# Patient Record
Sex: FEMALE | Race: OTHER
[De-identification: names, ages, dates, MRNs, and addresses within clinical notes are randomized per-mention and may not be internally consistent; named-entity substitution may affect disease eponyms.]

---

## 2019-12-19 ENCOUNTER — HOSPITAL ENCOUNTER (EMERGENCY)
Dept: HOSPITAL 41 - JD.ED | Age: 54
Discharge: HOME | End: 2019-12-19
Payer: COMMERCIAL

## 2019-12-19 VITALS — SYSTOLIC BLOOD PRESSURE: 122 MMHG | HEART RATE: 70 BPM | DIASTOLIC BLOOD PRESSURE: 71 MMHG

## 2019-12-19 DIAGNOSIS — R07.89: Primary | ICD-10-CM

## 2019-12-19 DIAGNOSIS — Z79.899: ICD-10-CM

## 2019-12-19 PROCEDURE — 85379 FIBRIN DEGRADATION QUANT: CPT

## 2019-12-19 PROCEDURE — 80053 COMPREHEN METABOLIC PANEL: CPT

## 2019-12-19 PROCEDURE — 99285 EMERGENCY DEPT VISIT HI MDM: CPT

## 2019-12-19 PROCEDURE — 93005 ELECTROCARDIOGRAM TRACING: CPT

## 2019-12-19 PROCEDURE — 96374 THER/PROPH/DIAG INJ IV PUSH: CPT

## 2019-12-19 PROCEDURE — 71046 X-RAY EXAM CHEST 2 VIEWS: CPT

## 2019-12-19 PROCEDURE — 36415 COLL VENOUS BLD VENIPUNCTURE: CPT

## 2019-12-19 PROCEDURE — 84484 ASSAY OF TROPONIN QUANT: CPT

## 2019-12-19 PROCEDURE — 85025 COMPLETE CBC W/AUTO DIFF WBC: CPT

## 2019-12-19 NOTE — EDM.PDOC
ED HPI GENERAL MEDICAL PROBLEM





- General


Chief Complaint: Chest Pain


Stated Complaint: CHEST PAIN


Time Seen by Provider: 12/19/19 14:57


Source of Information: Reports: Patient


History Limitations: Reports: No Limitations





- History of Present Illness


INITIAL COMMENTS - FREE TEXT/NARRATIVE: 





Patient is a 53-year-old female who presents with complaints of chest pain and 

shortness of breath that started on Monday. The symptoms began as chest 

"heaviness" and "pressure" with SOB on Monday. She states that the symptoms 

have not been constant since Monday.  At times they will disappear and then 

returned with a sharp pain.  She verbalizes the pain is throughout her thoracic 

region, anterior and posterior.  It is more painful to take a deep breath and 

she is tender to palpation, especially to her upper back.  She states that 

yesterday around 1700 she awoke from a nap with the pain present and was very 

diaphoretic and short of breath.  She denies any associated cough and has not 

been doing any activities that are out of the normal for her, including no 

heavy lifting recently.  She states that she does have a history of anxiety and 

she has been under increased stress as of recently.  She is currently being 

treated for anxiety and depression with Prozac and Xanax.  She denies any 

cardiac history or history of blood clots.


  ** Chest


Pain Score (Numeric/FACES): 7





- Related Data


 Allergies











Allergy/AdvReac Type Severity Reaction Status Date / Time


 


No Known Allergies Allergy   Verified 12/19/19 15:01











Home Meds: 


 Home Meds





ALPRAZolam [Alprazolam] 1 mg PO DAILY 05/12/14 [History]


Lactobacillus Combo No.10 [Probiotic] 4 mg PO DAILY 05/12/14 [History]


Omeprazole 20 mg PO BID 05/12/14 [History]


estradioL [Vivelle-Dot] 1 patch TOP ASDIRECTED 05/12/14 [History]


Fexofenadine/Pseudoephedrine [Allegra-D 24 Hour Tablet] 180 - 240 mg PO 

ASDIRECTED PRN 05/28/14 [History]


Bismuth Subsalicylate [Pepto Bismol] 2 tab PO QID 01/17/15 [History]


Dicyclomine HCl [Bentyl] 20 mg PO Q6HR PRN #10 tablet 01/17/15 [Rx]


Ondansetron [Zofran ODT] 4 mg PO Q6H PRN #10 tab.dis 01/17/15 [Rx]


Naproxen [Naprosyn] 500 mg PO Q12HR PRN #10 tab 12/19/19 [Rx]











ED ROS GENERAL





- Review of Systems


Review Of Systems: See Below


Constitutional: Reports: Diaphoresis.  Denies: Fever, Chills


HEENT: Reports: No Symptoms


Respiratory: Reports: Shortness of Breath.  Denies: Cough


Cardiovascular: Reports: Chest Pain.  Denies: Edema, Palpitations


Endocrine: Reports: No Symptoms


GI/Abdominal: Reports: No Symptoms.  Denies: Abdominal Pain, Diarrhea, Vomiting


: Reports: No Symptoms


Musculoskeletal: Reports: Back Pain, Other (generalized chest wall pain)


Skin: Reports: No Symptoms


Neurological: Reports: No Symptoms.  Denies: Dizziness, Headache


Psychiatric: Reports: Anxiety


Hematologic/Lymphatic: Reports: No Symptoms


Immunologic: Reports: No Symptoms





ED EXAM, GENERAL





- Physical Exam


Exam: See Below


Exam Limited By: No Limitations


General Appearance: Alert, WD/WN, No Apparent Distress


Head: Atraumatic, Normocephalic


Respiratory/Chest: No Respiratory Distress, Lungs Clear, Normal Breath Sounds, 

No Accessory Muscle Use, Other (generalized chest wall tenderness throughout 

the thorax and upper back.  States that the midline upper back is worse than 

the anterior chest.)


Cardiovascular: Normal Peripheral Pulses, Regular Rate, Rhythm, No Edema, No 

Murmur


GI/Abdominal: Normal Bowel Sounds, Soft, Non-Tender, No Distention


Neurological: Alert, Oriented, Normal Cognition


Psychiatric: Normal Affect, Normal Mood


Skin Exam: Warm, Dry, Intact, Normal Color, No Rash





EKG INTERPRETATION


EKG Date: 12/19/19


Time: 15:05


Rhythm: NSR


Rate (Beats/Min): 67


Axis: Normal


P-Wave: Present


QRS: Normal


ST-T: Normal


QT: Normal


Comparison: NA - No Prior EKG





Course





- Vital Signs


Last Recorded V/S: 


 Last Vital Signs











Temp  97.5 F   12/19/19 15:07


 


Pulse  70   12/19/19 15:07


 


Resp  16   12/19/19 15:07


 


BP  122/71   12/19/19 15:07


 


Pulse Ox  99   12/19/19 15:07














- Orders/Labs/Meds


Orders: 


 Active Orders 24 hr











 Category Date Time Status


 


 EKG Documentation Completion [RC] STAT Care  12/19/19 15:22 Active


 


 Peripheral IV Care [RC] .AS DIRECTED Care  12/19/19 15:23 Active


 


 Chest 2V [CR] Stat Exams  12/19/19 15:22 Taken


 


 CBC WITH AUTO DIFF [HEME] Stat Lab  12/19/19 15:09 Results


 


 Sodium Chloride 0.9% [Saline Flush] Med  12/19/19 15:22 Active





 10 ml FLUSH ASDIRECTED PRN   


 


 Peripheral IV Insertion Adult [OM.PC] Stat Oth  12/19/19 15:22 Ordered








 Medication Orders





Sodium Chloride (Saline Flush)  10 ml FLUSH ASDIRECTED PRN


   PRN Reason: Keep Vein Open


   Last Admin: 12/19/19 15:34  Dose: 10 ml








Labs: 


 Laboratory Tests











  12/19/19 12/19/19 12/19/19 Range/Units





  15:09 15:09 15:09 


 


WBC  8.27    (3.98-10.04)  K/mm3


 


RBC  5.41 H    (3.98-5.22)  M/mm3


 


Hgb  12.9    (11.2-15.7)  gm/dl


 


Hct  39.6    (34.1-44.9)  %


 


MCV  73.2 L    (79.4-94.8)  fl


 


MCH  23.8 L    (25.6-32.2)  pg


 


MCHC  32.6    (32.2-35.5)  g/dl


 


RDW Std Deviation  45.4    (36.4-46.3)  fL


 


Plt Count  364    (182-369)  K/mm3


 


MPV  10.3    (9.4-12.3)  fl


 


Neut % (Auto)  54.3    (34.0-71.1)  %


 


Lymph % (Auto)  38.5    (19.3-51.7)  %


 


Mono % (Auto)  5.1    (4.7-12.5)  %


 


Eos % (Auto)  1.7    (0.7-5.8)  


 


Baso % (Auto)  0.4    (0.1-1.2)  %


 


Neut # (Auto)  4.50    (1.56-6.13)  K/mm3


 


Lymph # (Auto)  3.18    (1.18-3.74)  K/mm3


 


Mono # (Auto)  0.42 H    (0.24-0.36)  K/mm3


 


Eos # (Auto)  0.14    (0.04-0.36)  K/mm3


 


Baso # (Auto)  0.03    (0.01-0.08)  K/mm3


 


D-Dimer, Quantitative   < 0.19 L   (0.19-0.50)  mg/L


 


Sodium    138  (136-145)  mEq/L


 


Potassium    4.0  (3.5-5.1)  mEq/L


 


Chloride    101  ()  mEq/L


 


Carbon Dioxide    27  (21-32)  mEq/L


 


Anion Gap    14.0  (5-15)  


 


BUN    12  (7-18)  mg/dL


 


Creatinine    0.9  (0.55-1.02)  mg/dL


 


Est Cr Clr Drug Dosing    58.42  mL/min


 


Estimated GFR (MDRD)    > 60  (>60)  mL/min


 


BUN/Creatinine Ratio    13.3 L  (14-18)  


 


Glucose    102  ()  mg/dL


 


Calcium    9.2  (8.5-10.1)  mg/dL


 


Total Bilirubin    0.2  (0.2-1.0)  mg/dL


 


AST    25  (15-37)  U/L


 


ALT    41  (14-59)  U/L


 


Alkaline Phosphatase    77  ()  U/L


 


Troponin I    < 0.017  (0.00-0.056)  ng/mL


 


Total Protein    7.4  (6.4-8.2)  g/dl


 


Albumin    4.1  (3.4-5.0)  g/dl


 


Globulin    3.3  gm/dL


 


Albumin/Globulin Ratio    1.2  (1-2)  











Meds: 


Medications











Generic Name Dose Route Start Last Admin





  Trade Name Freq  PRN Reason Stop Dose Admin


 


Sodium Chloride  10 ml  12/19/19 15:22  12/19/19 15:34





  Saline Flush  FLUSH   10 ml





  ASDIRECTED PRN   Administration





  Keep Vein Open   





     





     





     














Discontinued Medications














Generic Name Dose Route Start Last Admin





  Trade Name Freq  PRN Reason Stop Dose Admin


 


Ketorolac Tromethamine  30 mg  12/19/19 15:24  12/19/19 15:33





  Toradol  IVPUSH  12/19/19 15:25  30 mg





  ONETIME ONE   Administration





     





     





     





     














- Re-Assessments/Exams


Free Text/Narrative Re-Assessment/Exam: 





12/19/19 16:16


Patient's chest x-ray was normal.  Her labs are grossly unremarkable.  D-dimer 

was negative, as was the troponin.  Patient verbalized that she did have 

significant relief with the Toradol administration.  I feel that her pain is 

likely muscular related.  She does take 1 mg of Xanax at bedtime already, so I 

will not treat with Flexeril as this may be overly sedating for her.  I will 

start her on naprosyn 500 mg twice daily as needed for pain.





Departure





- Departure


Time of Disposition: 16:17


Disposition: Home, Self-Care 01


Condition: Good


Clinical Impression: 


 Chest wall pain





Prescriptions: 


Naproxen [Naprosyn] 500 mg PO Q12HR PRN #10 tab


 PRN Reason: Pain


Instructions:  Chest Wall Pain, Easy-to-Read, Nonspecific Chest Pain


Referrals: 


PCP,Not In Area [Primary Care Provider] - 


Forms:  ED Department Discharge


Additional Instructions: 


You were seen in the emergency department today for chest wall and upper back 

pain that started on Monday.  Your workup included blood work, EKG, and chest x-

ray.  These tests were normal.  Your heart examination was normal and there are 

no blood clots within your lungs.  It is likely that your pain is muscular in 

nature.





A prescription for Naprosyn 500mg every 12 hours as needed for pain has been 

sent to [facility]. I would recommend that you take this twice daily 

for the next few days and then as needed for discomfort.  Do no take ibuprofen 

in addition to this medication.





If you should experience any new or worsening symptoms, please do not hesitate 

to return to the emergency department.





Sepsis Event Note





- Evaluation


Sepsis Screening Result: No Definite Risk





- Focused Exam


Vital Signs: 


 Vital Signs











  Temp Pulse Resp BP Pulse Ox


 


 12/19/19 15:07  97.5 F  70  16  122/71  99


 


 12/19/19 15:01  97.5 F    











Date Exam was Performed: 12/19/19


Time Exam was Performed: 16:26





- My Orders


Last 24 Hours: 


My Active Orders





12/19/19 15:09


CBC WITH AUTO DIFF [HEME] Stat 





12/19/19 15:22


EKG Documentation Completion [RC] STAT 


Chest 2V [CR] Stat 


Sodium Chloride 0.9% [Saline Flush]   10 ml FLUSH ASDIRECTED PRN 


Peripheral IV Insertion Adult [OM.PC] Stat 





12/19/19 15:23


Peripheral IV Care [RC] .AS DIRECTED 














- Assessment/Plan


Last 24 Hours: 


My Active Orders





12/19/19 15:09


CBC WITH AUTO DIFF [HEME] Stat 





12/19/19 15:22


EKG Documentation Completion [RC] STAT 


Chest 2V [CR] Stat 


Sodium Chloride 0.9% [Saline Flush]   10 ml FLUSH ASDIRECTED PRN 


Peripheral IV Insertion Adult [OM.PC] Stat 





12/19/19 15:23


Peripheral IV Care [RC] .AS DIRECTED

## 2019-12-20 NOTE — CR
Chest:  Two views of the chest were obtained.

 

Comparison: No prior chest x-ray.

 

Heart size and mediastinum are normal.  Lungs are clear.  Slight 

degenerative change is scattered within the spine.  Surgical clips are

 noted from prior cholecystectomy.

 

Impression:

1.  Nothing acute is identified on two-view chest x-ray.

 

Diagnostic code #2

 

This report was dictated in [place] Standard Time